# Patient Record
(demographics unavailable — no encounter records)

---

## 2020-10-21 NOTE — PCM.PREANE
Preanesthetic Assessment





- Procedure


Proposed Procedure: 





Left Reverse Total Shoulder Arthroplasty





- Anesthesia/Transfusion/Family Hx


Anesthesia History: Prior Anesthesia Without Reaction


Family History of Anesthesia Reaction: No


Transfusion History: No Prior Transfusion(s)


Intubation History: Unknown





- Review of Systems


General: No Symptoms


Pulmonary: No Symptoms (Asthma: uses albuterol 2-3 times daily)


Cardiovascular: No Symptoms (HTN, elevated cholesterol)


Gastrointestinal: No Symptoms (GERD-depending on diet), Constipation 

(occasionally)


Neurological: No Symptoms, Tingling (left arm)


Other: Reports: Easy Bruising, Sinus Problem (seasonal allergies/history of 

sinus surgery)





- Physical Assessment


NPO Status Date: 10/21/20


NPO Status Time: 22:30


Vital Signs: 





HR:88


Sat:98%


Temp:98


Resp:16


B/P:124/70


Height: 1.65 m


Weight: 77 kg


ASA Class: 2


Mental Status: Alert & Oriented x3


Airway Class: Mallampati = 2


Dentition: Reports: Normal Dentition, Caries


Thyro-Mental Finger Breadths: 3


Mouth Opening Finger Breadths: 3


ROM/Head Extension: Full


Lungs: Clear to Auscultation, Normal Respiratory Effort


Cardiovascular: Regular Rate, Regular Rhythm, No Murmurs





- Lab


Values: 





All labs reviewed and noted and within acceptable ranges to proceed with 

scheduled procedure.





- Imaging/EKG


Impressions: 





CXR: negative


EKG:SR rate= 82, borderline left axis deviation





- Allergies


Allergies/Adverse Reactions: 


                                    Allergies











Allergy/AdvReac Type Severity Reaction Status Date / Time


 


animal dander Allergy  Airway Verified 10/21/20 16:05





   Tightness  


 


lisinopril Allergy  Cough Verified 10/21/20 16:05


 


Statins-Hmg-Coa Reductase Allergy  Muscle Verified 10/21/20 16:05





Inhibitor   Aches  


 


tree and shrub pollen Allergy  Airway Verified 10/21/20 16:05





   Tightness  


 


hay fever Allergy  Airway Uncoded 10/21/20 16:05





   Tightness  














- Anesthesia Plan


Pre-Op Medication Ordered: Other (Preoperative oral meds: lyrica, oxycodone, 

tylenol @0625)





- Acknowledgements


Anesthesia Type Planned: General Anesthesia (Left ISB under US guidance for post

operative pain control requested by Dr. White.)


Pt an Appropriate Candidate for the Planned Anesthesia: Yes


Alternatives and Risks of Anesthesia Discussed w Pt/Guardian: Yes


Pt/Guardian Understands and Agrees with Anesthesia Plan: Yes





PreAnesthesia Questionnaire





- HOME MEDS


Home Medications: 


                                    Home Meds





Albuterol Sulfate [Proair Hfa] 1 - 2 puff INH Q4H PRN 10/21/20 [History]


Ascorbic Acid [Vitamin C] 500 mg PO DAILY 10/21/20 [History]


Cholecalciferol (Vitamin D3) [Vitamin D3] 5,000 unit PO DAILY 10/21/20 [History]


Ezetimibe [Zetia] 10 mg PO DAILY 10/21/20 [History]


Fish Oil/Omega-3 Fatty Acids [Fish Oil 1,000 MG] 1 gm PO Q48H 10/21/20 [History]


Fluticasone/Salmeterol [Advair 250-50] 1 puff INH DAILY 10/21/20 [History]


Loratadine [Claritin] 10 mg PO DAILY PRN 10/21/20 [History]


Multivitamin 1 tab PO DAILY 10/21/20 [History]


Ubidecarenone [Coq-10] 100 mg PO Q48H 10/21/20 [History]


Zolpidem Tartrate [Ambien] 5 mg PO BEDTIME PRN 10/21/20 [History]


amLODIPine Besylate [Norvasc] 10 mg PO DAILY 10/21/20 [History]


Aspirin [Aspirin EC] 325 mg PO DAILY #40 tablet.dr 10/22/20 [Rx]


Cyclobenzaprine [Flexeril] 5 mg PO BID PRN #20 tab 10/22/20 [Rx]


oxyCODONE 5 - 10 mg PO Q4H PRN #60 tab 10/22/20 [Rx]











- CURRENT (IN HOUSE) MEDS


Current Meds: 





                               Current Medications





Lactated Ringer's (Ringers, Lactated)  1,000 mls @ 125 mls/hr IV ASDIRECTED VIPUL


Lidocaine/Sodium Bicarbonate (Buffered Lidocaine 1% In Ns 8.4%)  0.25 ml IDERM 

ONETIME PRN


   PRN Reason: Prior to IV Start


Sodium Chloride (Saline Flush)  10 ml FLUSH ASDIRECTED PRN


   PRN Reason: Keep Vein Open

## 2020-10-21 NOTE — PCM.SN.2
- Free Text/Narrative


Note: 





Date: 10/22/2020


Time Out: 0644


Start: 0644


Stop: 0700





Surgical Procedure:   Left Reverse Total Shoulder Arthroplasty


Diagnosis: Left shoulder OA


Current Procedure: Left interscalene block under US guidance for postoperative 

pain control requested by Dr. White.     


Patient chart reviewed, risk/benefits discussed with patient, consent obtained.


Patient positioned supine, monitors/alarms on, oxygen placed via nasal cannula 

at 2 LPM.





IV sedation administered:


Versed 2mg IV, Fentanyl 50mcg IV given in preop prior to block placement.


Left shoulder prepped with two chloropreps. Sterile drapes placed with aseptic 

technique noted.  Under US guidance, left subclavian artery visualized along 

with the left brachial plexus.  Plexus followed up to C6 cricoid level, and area

localized with 2mls of 1% lidocaine.  





22gauge 2 inch stimiplex needle advanced under US with 0.6mV with stimulation of

biceps noted. Good stimulation noted with decreased voltage and absent at 

0.3mVs.  1ml of Normal Saline injected with loss of stimulation noted to 

confirm needle not placed intraneurally.





Incremental dosing of 5mls with negative aspiration noted prior to each 

injection of 0.5% ropivacaine with 1:200,000 epinephrine. Total volume=30mls.





Please refer to nurses noted for vital signs.


Diana Brown CRNA

## 2020-10-22 NOTE — PCM.POSTAN
POST ANESTHESIA ASSESSMENT





- MENTAL STATUS


Mental Status: Alert





- VITAL SIGNS


Vital Signs: 


                                Last Vital Signs











Temp  97.8  10/22/20 0713


 


Pulse  98   10/22/20 0713


 


Resp  16   10/22/20 0713


 


BP  129/72   10/22/20 0713


 


Pulse Ox  98   10/22/20 0713














- RESPIRATORY


Respiratory Status: Respiratory Rate WNL, Airway Patent, O2 Saturation Stable, 

Supplemental Oxygen





- CARDIOVASCULAR


CV Status: Pulse Rate WNL, Blood Pressure Stable





- GASTROINTESTINAL


GI Status: No Symptoms





- POST OP HYDRATION


Hydration Status: Adequate & Stable

## 2020-10-22 NOTE — PCM48HPAN
Post Anesthesia Note





- EVALUATION WITHIN 48HRS OF ANESTHETIC


Vital Signs in Normal Range: Yes


Patient Participated in Evaluation: Yes


Respiratory Function Stable: Yes


Airway Patent: Yes


Cardiovascular Function Stable: Yes


Hydration Status Stable: Yes


Pain Control Satisfactory: Yes


Nausea and Vomiting Control Satisfactory: Yes


Mental Status Recovered: Yes


Vital Signs: 


                                Last Vital Signs











Temp  36.4 C   10/22/20 11:00


 


Pulse  85   10/22/20 12:00


 


Resp  12   10/22/20 12:00


 


BP  104/64   10/22/20 12:00


 


Pulse Ox  92 L  10/22/20 12:00

## 2020-10-29 NOTE — CR
PROCEDURE INFORMATION:

Exam: FL Fluoroscopy, Up to 1 Hour Physician Time; Radiologist Not Present For 
Fluoroscopy

Exam date and time: 10/22/2020 8:32 AM

Age: 67 years old

Clinical indication: Condition or disease; Condition/disease: Reverse total 
shoulder



TECHNIQUE:

Imaging protocol: Fluoroscopy , up to 1 hour physician or other qualified health
care professional

time. This radiologist did not supervise this procedure. Exam supervised by 
facility personnel. Report

for radiation dosage reporting and documentation only.



COMPARISON:

No relevant prior studies available.



RADIATION DOSE METRICS:

Fluoroscopy time (seconds): 5.2 seconds

Number of fluoro spot images: 1

Reference air kerma (LOVE): Not provided.



FINDINGS:

Procedural imaging:

Status post a left reverse total shoulder arthroplasty

Notes: Fluoroscopy supervised by facility personnel. See also separate procedure
report.



IMPRESSION:

Fluoroscopy dosage documentation. See also separate procedure notes.



Thank you for allowing us to participate in the care of your patient.



Dictated and Authenticated by: Adolfo Zimmerman MD

10/22/2020 9:46 AM Central Time ([place] & [place])

LEONELA

## 2020-10-29 NOTE — CR
PROCEDURE INFORMATION:

Exam: XR Left Shoulder

Exam date and time: 10/22/2020 9:02 AM

Age: 67 years old

Clinical indication: Condition or disease; Other: Post op; Prior surgery; 
Surgery date: Post-operative

(0-2 days)



TECHNIQUE:

Imaging protocol: XR Left shoulder.

Views: 1 view.



COMPARISON:

CR Shoulder Comp Lt 9/25/2020 11:31 AM



FINDINGS:

Bones/joints: Status post left reversed total shoulder arthroplasty without 
evidence of loosening,

fracture, or failure.

Soft tissues: There is subcutaneous emphysema as well as air within the joint 
space consistent with

the recent surgical procedure.



IMPRESSION:

Status post left reversed total shoulder arthroplasty without evidence of 
loosening, fracture, or failure.



Thank you for allowing us to participate in the care of your patient.



Dictated and Authenticated by: Adolfo Zimmerman MD

10/22/2020 10:42 AM Central Time ([place] & [place])

LEONELA

## 2020-10-30 NOTE — PCM.OPNOTE
- General Post-Op/Procedure Note


Date of Surgery/Procedure: 10/22/20


Operative Procedure(s): left reverse total shoulder arthroplasty


Pre Op Diagnosis: left shoulder rotator cuff tear arthropathy


Post-Op Diagnosis: Same


Anesthesia Technique: General ET Tube, Regional Block


Primary Surgeon: Jose White


Anesthesia Provider: Diana Brown


Assistant: Solange Fields


Assistant: Miya Chapa


EBL in mLs: 50


Complications: None


Condition: Good


Free Text/Narrative:: 


13 stem


36+6


4mm poly

## 2020-11-03 NOTE — OR
DATE OF OPERATION:  10/22/2020

 

SURGEON:  Jose White MD

 

OPERATION PERFORMED:  Left reverse total shoulder arthroplasty.

 

PREOPERATIVE DIAGNOSIS:

Left shoulder rotator cuff tear arthropathy.

 

POSTOPERATIVE DIAGNOSIS:

Left shoulder rotator cuff tear arthropathy.

 

ANESTHESIA:

General endotracheal intubation with regional interscalene block.

 

ANESTHESIA PROVIDER:

Diana Brown CRNA.

 

ASSISTANTS:

Solange Fields PA-C; and Miya Chapa LPN.

 

ESTIMATED BLOOD LOSS:

50 mL.

 

COMPLICATIONS:

None.

 

CONDITION:

Stable.

 

IMPLANTS:

1. Akhil size 13, 135 degree humeral stem.

2. Akhil size 36 +6 Glenosphere.

3. [place] size 4 mm poly.

4. Akhil size 28 mm concentric glenoid baseplate.

 

DESCRIPTION OF PROCEDURE:

The patient was identified in the preoperative holding area, where proper site

was marked and identified by the surgeon.  The patient was taken back to the

operating theater where after adequate anesthesia, the patient's left upper

extremity was sterilely prepped and draped in the usual sterile fashion.  OR

time-out was performed.  The patient received 2 g IV Ancef.  At this time,

standard deltopectoral incision was made.  This was taken down to the cephalic

vein.  Cephalic vein was retracted laterally with the deltoid.  The

clavipectoral fascia was incised.  The conjoined tendon was retracted medially.

The anterior humeral circumflex vessels were ligated.  Biceps tendon was

identified and biceps tendon underwent a biceps tenodesis near the pectoralis

major.  Biceps was then resected all the way back to the level of the glenoid.

The peel down of the subscapularis tendon was then done and the humeral head was

dislocated.  Humeral head cut was then completed and found to be adequate.

Attention was turned to the glenoid.  Anterior and posterior glenoid retractors

were then placed.  Circumferential removal of any remaining labrum as well as

capsulectomy were then performed.  A guide pin was then placed in a center-

center position with roughly 10 degrees of inferior tilt.  The 28 mm reamer was

then utilized until there was a good smile sign with good bony bleeding

cancellous bone.  The glenoid baseplate was then placed and the central

compression screw was placed and found to have adequate purchase with good

apposition of the glenoid baseplate on the face of the glenoid.  The 36 +6

Glenosphere was then impacted into place and attention was turned to the

humerus.  Starting with a 10 broach, I was able to broach up to a size 13, which

was found to be rotationally and vertically stable.  Trial implants were then

placed.  C-arm fluoroscopy was utilized making sure all the implants were well

positioned.  There was no instability.  The patient had full range of motion

with no catching.  The trial implants were then removed.  The 13 stem with the 4

mm poly was constructed on the back table and then impacted en bloc into the

shoulder.  Shoulder was then relocated.  C-arm fluoroscopy was again utilized

showing all components to be well aligned.  1 L of pulse lavage irrigation with

Ancef was then irrigated through the shoulder along with Aricept irrigation.

Topical tranexamic acid and vancomycin powder were applied.  2-0 Vicryl was used

subcutaneously and Prineo was used for closure of the skin.  The patient was

placed in a sterile soft dressing and a pillow sling and sent to PACU in stable

condition.

 

DD:  10/30/2020 09:57:43

DT:  10/30/2020 10:26:02  SYLVIA

Job #:  [id number]